# Patient Record
Sex: MALE | Race: BLACK OR AFRICAN AMERICAN | Employment: UNEMPLOYED | URBAN - METROPOLITAN AREA
[De-identification: names, ages, dates, MRNs, and addresses within clinical notes are randomized per-mention and may not be internally consistent; named-entity substitution may affect disease eponyms.]

---

## 2019-10-16 ENCOUNTER — HOSPITAL ENCOUNTER (EMERGENCY)
Facility: CLINIC | Age: 29
Discharge: HOME | End: 2019-10-16
Attending: EMERGENCY MEDICINE | Admitting: EMERGENCY MEDICINE
Payer: COMMERCIAL

## 2019-10-16 ENCOUNTER — APPOINTMENT (OUTPATIENT)
Dept: GENERAL RADIOLOGY | Facility: CLINIC | Age: 29
End: 2019-10-16
Attending: EMERGENCY MEDICINE
Payer: COMMERCIAL

## 2019-10-16 VITALS
BODY MASS INDEX: 24.86 KG/M2 | SYSTOLIC BLOOD PRESSURE: 125 MMHG | HEART RATE: 95 BPM | TEMPERATURE: 98.1 F | RESPIRATION RATE: 15 BRPM | HEIGHT: 68 IN | OXYGEN SATURATION: 97 % | WEIGHT: 164 LBS | DIASTOLIC BLOOD PRESSURE: 89 MMHG

## 2019-10-16 DIAGNOSIS — J45.20 MILD INTERMITTENT REACTIVE AIRWAY DISEASE WITHOUT COMPLICATION: ICD-10-CM

## 2019-10-16 PROCEDURE — 71046 X-RAY EXAM CHEST 2 VIEWS: CPT

## 2019-10-16 PROCEDURE — 94640 AIRWAY INHALATION TREATMENT: CPT

## 2019-10-16 PROCEDURE — 25000125 ZZHC RX 250: Performed by: EMERGENCY MEDICINE

## 2019-10-16 PROCEDURE — 99283 EMERGENCY DEPT VISIT LOW MDM: CPT | Mod: 25

## 2019-10-16 RX ORDER — IPRATROPIUM BROMIDE AND ALBUTEROL SULFATE 2.5; .5 MG/3ML; MG/3ML
3 SOLUTION RESPIRATORY (INHALATION) ONCE
Status: COMPLETED | OUTPATIENT
Start: 2019-10-16 | End: 2019-10-16

## 2019-10-16 RX ORDER — ALBUTEROL SULFATE 90 UG/1
2 AEROSOL, METERED RESPIRATORY (INHALATION) EVERY 6 HOURS PRN
Qty: 1 INHALER | Refills: 0 | Status: SHIPPED | OUTPATIENT
Start: 2019-10-16

## 2019-10-16 RX ORDER — LORAZEPAM 1 MG/1
1 TABLET ORAL DAILY PRN
COMMUNITY
Start: 2019-09-16

## 2019-10-16 RX ADMIN — IPRATROPIUM BROMIDE AND ALBUTEROL SULFATE 3 ML: .5; 3 SOLUTION RESPIRATORY (INHALATION) at 13:35

## 2019-10-16 ASSESSMENT — ENCOUNTER SYMPTOMS
COUGH: 1
VOMITING: 1
ABDOMINAL PAIN: 0
SHORTNESS OF BREATH: 1
FEVER: 0
LIGHT-HEADEDNESS: 1

## 2019-10-16 ASSESSMENT — MIFFLIN-ST. JEOR: SCORE: 1693.4

## 2019-10-16 NOTE — ED PROVIDER NOTES
"  History     Chief Complaint:  Shortness of Breath    HPI   Manolo Phoenix is a 29 year old male who presents to the emergency department along with his mother for shortness of breath. The patient states he was sitting down on his phone, drinking a pop, when he started coughing and then subsequently vomiting. He reports being slightly lightheaded and noted being short of breath after the incident. He denies any chest pain, abdominal pain, fever, or congestion. He denies previous episodes similar to the current one. He denies having asthma. Of note, his family has a history of hypertension and asthma.    Allergies:  No known drug allergies    Medications:    Lorazepam   Aripiprazole  Risperidone  Ergocalciferol    Past Medical History:    Schizophrenia  Vitamin D deficiency    Past Surgical History:    Appendectomy    Family History:    Hypertension  Asthma     Social History:  Smoking status: Never smoker   Alcohol use: No  Drug use: No  The patient presents to the emergency department along with his girlfriend.  Marital Status:  Single [1]    Review of Systems   Constitutional: Negative for fever.   HENT: Negative for congestion.    Respiratory: Positive for cough and shortness of breath.    Cardiovascular: Negative for chest pain.   Gastrointestinal: Positive for vomiting. Negative for abdominal pain.   Neurological: Positive for light-headedness.   All other systems reviewed and are negative.        Physical Exam   Patient Vitals for the past 24 hrs:   BP Temp Temp src Pulse Resp SpO2 Height Weight   10/16/19 1315 125/83 98.1  F (36.7  C) Oral 77 18 98 % 1.727 m (5' 8\") 74.4 kg (164 lb)     Physical Exam  General: Alert, interactive in mild distress  Head:  Scalp is atraumatic  Eyes:  The pupils are equal, round, and reactive to light    EOM's intact    No scleral icterus  ENT:      Nose:  The external nose is normal  Ears:  External ears are normal  Mouth/Throat: The oropharynx is normal    Mucus membranes are " moist       Neck:  Normal range of motion.      There is no rigidity.    Trachea is in the midline         CV:  Regular rate and rhythm    No murmur   Resp:  Mild occasional wheezes    Non-labored, no retractions or accessory muscle use      GI:  Abdomen is soft, no distension, no tenderness.       MS:  Normal strength in all 4 extremities  Skin:  Warm and dry, No rash or lesions noted.  Neuro: Strength 5/5 x4.    Psych:  Awake. Alert.  Normal affect.      Appropriate interactions.    Emergency Department Course   Imaging:  Radiographic findings were communicated with the patient and family who voiced understanding of the findings.    XR Chest 2 Views  IMPRESSION: PA and lateral views of the chest. Lungs are clear. Heart  is normal in size. No effusions are evident. No pneumothorax.  As read by Radiology.    Interventions:  1335 DuoNeb 3 mL Nebulization    Emergency Department Course:  Past medical records, nursing notes, and vitals reviewed.  1318: I performed an exam of the patient and obtained history, as documented above.     Patient is PERC negative.    The patient was sent for a chest x-ray while in the emergency department, findings above.    1358: I rechecked the patient. Findings and plan explained to the Patient. Patient discharged home with instructions regarding supportive care, medications, and reasons to return. The importance of close follow-up was reviewed.   Impression & Plan    Medical Decision Making:  Mr. Phoenix was seen and evaluated. He had a coughing episode and subsequently had posttussive emesis and now has mild wheezing. The above workup was undertaken and demonstrated no acute findings. He felt improved with a bronchial dilator in the ED and his chest radiograph demonstrates no acute findings. I believe he can safely be discharged to home. I've prescribed albuterol inhaler. Recommended return if new symptoms develop. He is PERC rule negative and has no pain to suggest PE, acute coronary  syndrome, or more concerning illness.    Diagnosis:    ICD-10-CM   1. Mild intermittent reactive airway disease without complication J45.20     Disposition:  Discharged to home with instructions for follow up.    Discharge Medications:  New Prescriptions    ALBUTEROL (PROAIR HFA/PROVENTIL HFA/VENTOLIN HFA) 108 (90 BASE) MCG/ACT INHALER    Inhale 2 puffs into the lungs every 6 hours as needed for shortness of breath / dyspnea or wheezing     Francisco Arreaga  10/16/2019    EMERGENCY DEPARTMENT  Scribe Disclosure:  I, Francisco Arreaga, am serving as a scribe at 1:18 PM on 10/16/2019 to document services personally performed by Aurelio Reyes MD.based on my observations and the provider's statements to me.   Scribe Disclosure:  I, Quynh Holliday, am serving as a scribe at 1:41 PM on 10/16/2019 to document services personally performed by Aurelio Reyes MD based on my observations and the provider's statements to me.        Aurelio Reyes MD  10/16/19 [id number]

## 2019-10-16 NOTE — ED AVS SNAPSHOT
Emergency Department  [address] [place] [id number]  Phone:  [phone]  Fax:  [phone]                                    Manolo Phoenix   MRN: [id number]    Department:   Emergency Department   Date of Visit:  10/16/2019           After Visit Summary Signature Page    I have received my discharge instructions, and my questions have been answered. I have discussed any challenges I see with this plan with the nurse or doctor.    ..........................................................................................................................................  Patient/Patient Representative Signature      ..........................................................................................................................................  Patient Representative Print Name and Relationship to Patient    ..................................................               ................................................  Date                                   Time    ..........................................................................................................................................  Reviewed by Signature/Title    ...................................................              ..............................................  Date                                               Time          22EPIC Rev 08/18